# Patient Record
Sex: FEMALE | Race: OTHER | HISPANIC OR LATINO
[De-identification: names, ages, dates, MRNs, and addresses within clinical notes are randomized per-mention and may not be internally consistent; named-entity substitution may affect disease eponyms.]

---

## 2017-10-25 ENCOUNTER — APPOINTMENT (OUTPATIENT)
Dept: INTERNAL MEDICINE | Facility: CLINIC | Age: 47
End: 2017-10-25
Payer: MEDICAID

## 2017-10-25 DIAGNOSIS — F41.0 PANIC DISORDER [EPISODIC PAROXYSMAL ANXIETY]: ICD-10-CM

## 2017-10-25 PROCEDURE — 99396 PREV VISIT EST AGE 40-64: CPT

## 2017-10-26 VITALS
WEIGHT: 157 LBS | BODY MASS INDEX: 26.95 KG/M2 | DIASTOLIC BLOOD PRESSURE: 82 MMHG | HEART RATE: 74 BPM | RESPIRATION RATE: 14 BRPM | SYSTOLIC BLOOD PRESSURE: 120 MMHG

## 2017-10-31 LAB
25(OH)D3 SERPL-MCNC: 17.9 NG/ML
ALBUMIN SERPL ELPH-MCNC: 4.2 G/DL
ALP BLD-CCNC: 94 U/L
ALT SERPL-CCNC: 15 U/L
ANION GAP SERPL CALC-SCNC: 13 MMOL/L
AST SERPL-CCNC: 18 U/L
BILIRUB SERPL-MCNC: 0.2 MG/DL
BUN SERPL-MCNC: 12 MG/DL
CALCIUM SERPL-MCNC: 9.7 MG/DL
CHLORIDE SERPL-SCNC: 106 MMOL/L
CHOLEST SERPL-MCNC: 210 MG/DL
CHOLEST/HDLC SERPL: 3.3 RATIO
CO2 SERPL-SCNC: 24 MMOL/L
CREAT SERPL-MCNC: 0.84 MG/DL
GLUCOSE SERPL-MCNC: 101 MG/DL
HBA1C MFR BLD HPLC: 5.7 %
HDLC SERPL-MCNC: 63 MG/DL
LDLC SERPL CALC-MCNC: 126 MG/DL
POTASSIUM SERPL-SCNC: 4.1 MMOL/L
PROT SERPL-MCNC: 7.5 G/DL
SODIUM SERPL-SCNC: 143 MMOL/L
TRIGL SERPL-MCNC: 107 MG/DL
TSH SERPL-ACNC: 4.27 UIU/ML

## 2018-08-28 ENCOUNTER — OTHER (OUTPATIENT)
Age: 48
End: 2018-08-28

## 2019-04-10 ENCOUNTER — TRANSCRIPTION ENCOUNTER (OUTPATIENT)
Age: 49
End: 2019-04-10

## 2020-03-11 ENCOUNTER — TRANSCRIPTION ENCOUNTER (OUTPATIENT)
Age: 50
End: 2020-03-11

## 2020-04-03 ENCOUNTER — TRANSCRIPTION ENCOUNTER (OUTPATIENT)
Age: 50
End: 2020-04-03

## 2020-04-10 ENCOUNTER — APPOINTMENT (OUTPATIENT)
Dept: INTERNAL MEDICINE | Facility: CLINIC | Age: 50
End: 2020-04-10
Payer: COMMERCIAL

## 2020-04-10 PROCEDURE — 99443: CPT

## 2020-04-11 NOTE — HISTORY OF PRESENT ILLNESS
[Verbal consent obtained from patient] : the patient, [unfilled] [FreeTextEntry1] : Pt wanted telephone visit to review known + COVID hx, and to get help with lingering syx.  \par \par Reports course : works as a director of youth services in [employer], had + coworkers, one .  On 3/11 she had cough, started w fevers 100-102, and had some exacerbation of ?IBS diarrhea (vs. viral syx).  She went for eval/testing - was +.  Went through illness of fever/fatigue/myalgia, not much cough worsening, no sob.  Recuperated.  Has been fever free since 3/21.  Now left with some moments of dry cough - was given tessalon perles and albuterol this past week, and has used those sparingly.  \par \par She is bothered by that cough, by a return of her panic attacks (has definite hx of anxiety - has needed SSRI and clonazepam in past), difficulty sleeping.  Feels very jittery - she wonders if the lost sleep, the occ albuterol, her processing what happened, losing her co worker all responsible.  Had access to tele psychiatrist.  Discussed, started on sertraline 25 mg, still on.  She was wondering if that was contributing to her jitteriness, insomnia.  Started having some fluttering/skip feeling in her chest.  Got very worried about those as she couldn't fall asleep.  Went to urgent care - bp was up, they tried to reassure about the likely APCs, has had continued hot flash feeling/lightheaded, feelings of apprehension, etc.  \par \par Had long talk - reassured her she had mild course of COVID, she realizes.  Likely not viral anymore, left with postinfectious cough.  She's like to renew benzonatate, was helpful.  Also encouraged to continue the SSRI, it's low dose and doubt responsible for any of her syx now.  Believe her insomnia, palpitations, jitteriness all related to her anxiety, natural for everything she went through, and with her hx of same/panic attacks, etc.  \par \par Decided that for next 2 weeks or so, she'd add clonazepam .5 BID just to get relief from the anxious part of her syx.  She agrees and will report its addition to the telepsychiatrist as they decide to titrate the sertraline at their next scheduled talk.  Reassured her about all syx, believe she just needs to ease adrenaline, get some sleep.  She will call with any changes/worsening.  FC [Time Spent: ___ minutes] : I have spent [unfilled] minutes with the patient on the telephone

## 2020-04-22 ENCOUNTER — APPOINTMENT (OUTPATIENT)
Dept: INTERNAL MEDICINE | Facility: CLINIC | Age: 50
End: 2020-04-22
Payer: COMMERCIAL

## 2020-04-22 PROCEDURE — 99443: CPT

## 2020-04-26 ENCOUNTER — TRANSCRIPTION ENCOUNTER (OUTPATIENT)
Age: 50
End: 2020-04-26

## 2020-06-03 ENCOUNTER — APPOINTMENT (OUTPATIENT)
Dept: INTERNAL MEDICINE | Facility: CLINIC | Age: 50
End: 2020-06-03

## 2020-06-09 ENCOUNTER — APPOINTMENT (OUTPATIENT)
Dept: INTERNAL MEDICINE | Facility: CLINIC | Age: 50
End: 2020-06-09
Payer: COMMERCIAL

## 2020-06-09 VITALS
RESPIRATION RATE: 14 BRPM | DIASTOLIC BLOOD PRESSURE: 76 MMHG | HEART RATE: 72 BPM | SYSTOLIC BLOOD PRESSURE: 118 MMHG | WEIGHT: 140 LBS | BODY MASS INDEX: 24.03 KG/M2

## 2020-06-09 DIAGNOSIS — E05.90 THYROTOXICOSIS, UNSPECIFIED W/OUT THYROTOXIC CRISIS OR STORM: ICD-10-CM

## 2020-06-09 DIAGNOSIS — Z00.00 ENCOUNTER FOR GENERAL ADULT MEDICAL EXAMINATION W/OUT ABNORMAL FINDINGS: ICD-10-CM

## 2020-06-09 DIAGNOSIS — R00.2 PALPITATIONS: ICD-10-CM

## 2020-06-09 DIAGNOSIS — F41.9 ANXIETY DISORDER, UNSPECIFIED: ICD-10-CM

## 2020-06-09 DIAGNOSIS — U07.1 COVID-19: ICD-10-CM

## 2020-06-09 DIAGNOSIS — J30.9 ALLERGIC RHINITIS, UNSPECIFIED: ICD-10-CM

## 2020-06-09 DIAGNOSIS — R05 COUGH: ICD-10-CM

## 2020-06-09 PROCEDURE — G0442 ANNUAL ALCOHOL SCREEN 15 MIN: CPT

## 2020-06-09 PROCEDURE — 99396 PREV VISIT EST AGE 40-64: CPT | Mod: 25

## 2020-06-09 RX ORDER — BENZONATATE 100 MG/1
100 CAPSULE ORAL 3 TIMES DAILY
Qty: 21 | Refills: 0 | Status: DISCONTINUED | COMMUNITY
Start: 2020-04-10 | End: 2020-06-09

## 2020-06-09 RX ORDER — CLONAZEPAM 0.5 MG/1
0.5 TABLET ORAL
Qty: 14 | Refills: 0 | Status: ACTIVE | COMMUNITY
Start: 2017-10-25 | End: 1900-01-01

## 2020-06-09 RX ORDER — FLUTICASONE PROPIONATE 50 UG/1
50 SPRAY, METERED NASAL DAILY
Qty: 3 | Refills: 3 | Status: ACTIVE | COMMUNITY
Start: 2020-06-09 | End: 1900-01-01

## 2020-06-09 NOTE — ASSESSMENT
[FreeTextEntry1] : \par Age appropriate screening tests and vaccines reviewed.  \par Rationale for services described. \par Declines Tdap Vaccine\par Due for Mammogram\par Due for Pap smear\par \par This patient has no special communication needs.\par This patient's health literacy was assessed through teach back and was found to be adequate.\par \par \par \par \par \par \par

## 2020-06-09 NOTE — PHYSICAL EXAM
[Well Nourished] : well nourished [No Acute Distress] : no acute distress [Well Developed] : well developed [Well-Appearing] : well-appearing [Normal Sclera/Conjunctiva] : normal sclera/conjunctiva [PERRL] : pupils equal round and reactive to light [EOMI] : extraocular movements intact [Normal Outer Ear/Nose] : the outer ears and nose were normal in appearance [Normal Oropharynx] : the oropharynx was normal [No Lymphadenopathy] : no lymphadenopathy [No JVD] : no jugular venous distention [Supple] : supple [Thyroid Normal, No Nodules] : the thyroid was normal and there were no nodules present [No Respiratory Distress] : no respiratory distress  [No Accessory Muscle Use] : no accessory muscle use [Normal Rate] : normal rate  [Clear to Auscultation] : lungs were clear to auscultation bilaterally [Regular Rhythm] : with a regular rhythm [Normal S1, S2] : normal S1 and S2 [No Carotid Bruits] : no carotid bruits [No Murmur] : no murmur heard [No Varicosities] : no varicosities [No Abdominal Bruit] : a ~M bruit was not heard ~T in the abdomen [Pedal Pulses Present] : the pedal pulses are present [No Edema] : there was no peripheral edema [No Palpable Aorta] : no palpable aorta [Soft] : abdomen soft [No Extremity Clubbing/Cyanosis] : no extremity clubbing/cyanosis [Non-distended] : non-distended [Non Tender] : non-tender [No Masses] : no abdominal mass palpated [No HSM] : no HSM [Normal Bowel Sounds] : normal bowel sounds [Normal Posterior Cervical Nodes] : no posterior cervical lymphadenopathy [Normal Anterior Cervical Nodes] : no anterior cervical lymphadenopathy [No CVA Tenderness] : no CVA  tenderness [No Spinal Tenderness] : no spinal tenderness [No Joint Swelling] : no joint swelling [Grossly Normal Strength/Tone] : grossly normal strength/tone [No Rash] : no rash [Coordination Grossly Intact] : coordination grossly intact [No Focal Deficits] : no focal deficits [Normal Gait] : normal gait [Normal Insight/Judgement] : insight and judgment were intact [Deep Tendon Reflexes (DTR)] : deep tendon reflexes were 2+ and symmetric [Normal Affect] : the affect was normal [de-identified] : Markedly inflamed nasal mucosa

## 2020-06-09 NOTE — HEALTH RISK ASSESSMENT
[Very Good] : ~his/her~ current health as very good [Good] : ~his/her~  mood as  good [] : No [Monthly or less (1 pt)] : Monthly or less (1 point) [1 or 2 (0 pts)] : 1 or 2 (0 points) [Never (0 pts)] : Never (0 points) [No] : In the past 12 months have you used drugs other than those required for medical reasons? No [No falls in past year] : Patient reported no falls in the past year [0] : 2) Feeling down, depressed, or hopeless: Not at all (0) [Audit-CScore] : 1 [[id number]] : 0 [Change in mental status noted] : No change in mental status noted [Language] : denies difficulty with language [Behavior] : denies difficulty with behavior [Learning/Retaining New Information] : denies difficulty learning/retaining new information [Handling Complex Tasks] : denies difficulty handling complex tasks [Reasoning] : denies difficulty with reasoning [Spatial Ability and Orientation] : denies difficulty with spatial ability and orientation [Employed] : employed [Feels Safe at Home] : Feels safe at home [Fully functional (using the telephone, shopping, preparing meals, housekeeping, doing laundry, using] : Fully functional and needs no help or supervision to perform IADLs (using the telephone, shopping, preparing meals, housekeeping, doing laundry, using transportation, managing medications and managing finances) [Fully functional (bathing, dressing, toileting, transferring, walking, feeding)] : Fully functional (bathing, dressing, toileting, transferring, walking, feeding) [Reports changes in hearing] : Reports no changes in hearing [Reports normal functional visual acuity (ie: able to read med bottle)] : Reports normal functional visual acuity [Reports changes in vision] : Reports no changes in vision [Reports changes in dental health] : Reports no changes in dental health [Smoke Detector] : smoke detector [Seat Belt] :  uses seat belt [PapSmearComments] : will make appointment with gyn

## 2020-06-09 NOTE — HISTORY OF PRESENT ILLNESS
[FreeTextEntry1] : Ms. ROE is here for an annual physical examination and assessment.\par  [de-identified] : She generally feels well with a few specific complaints. Her recent health has been good; recovered well from COVID.\par \par Has two issues. \par \par Denies headache, dizziness.\par Denies rash, fatigue, fever, weight loss, anorexia.\par Denies wheezing.\par Denies CP, SOB, WAY, edema, palpitations.\par Denies abdominal pain, N/V/D/C. Denies BRBPR, melena, dysphagia.\par Denies dysuria, frequency, hematuria, hesitancy.\par Denies weakness, numbness, gait instability.\par \par \par \par  [Initial Evaluation] : an initial evaluation of [Runny Nose] : runny nose [Nasal Congestion] : nasal congestion [Cough] : cough [Itching Throat] : itching throat [Sneezing] : no sneezing [Wheezing] : no wheezing [Gradual] : gradual [Currently Experiencing] : The patient is currently experiencing symptoms. [___ Week(s) Ago] : [unfilled] week(s) ago [Pollen Exposure] : pollen exposure [Daytime] : during the day [Dyspnea] : no dyspnea [de-identified] : ear popping [Anxiety] : anxiety [None] : ~He/She~ has had no significant interval events [Anxiety] : stable anxiety [Decreased Concentrating Ability] : denies difficulty concentrating [Feeling Restless] : denies restlessness [Anxiety With Persistent Worry About Recurrent Panic Attacks] : denies panic attacks [Sleep Disturbances] : denies sleep disruption [Depression] : denies depression [de-identified] : stable; seeing psychiatrist

## 2020-06-10 LAB
25(OH)D3 SERPL-MCNC: 13.8 NG/ML
ALBUMIN SERPL ELPH-MCNC: 4.7 G/DL
ALP BLD-CCNC: 89 U/L
ALT SERPL-CCNC: 21 U/L
ANION GAP SERPL CALC-SCNC: 17 MMOL/L
AST SERPL-CCNC: 20 U/L
BASOPHILS # BLD AUTO: 0.07 K/UL
BASOPHILS NFR BLD AUTO: 1.3 %
BILIRUB SERPL-MCNC: 0.5 MG/DL
BUN SERPL-MCNC: 11 MG/DL
CALCIUM SERPL-MCNC: 9.6 MG/DL
CHLORIDE SERPL-SCNC: 104 MMOL/L
CHOLEST SERPL-MCNC: 189 MG/DL
CHOLEST/HDLC SERPL: 3.4 RATIO
CO2 SERPL-SCNC: 22 MMOL/L
CREAT SERPL-MCNC: 0.73 MG/DL
EOSINOPHIL # BLD AUTO: 0.02 K/UL
EOSINOPHIL NFR BLD AUTO: 0.4 %
ESTIMATED AVERAGE GLUCOSE: 108 MG/DL
GLUCOSE SERPL-MCNC: 95 MG/DL
HBA1C MFR BLD HPLC: 5.4 %
HCT VFR BLD CALC: 43.4 %
HDLC SERPL-MCNC: 56 MG/DL
HGB BLD-MCNC: 13.9 G/DL
IMM GRANULOCYTES NFR BLD AUTO: 0.2 %
LDLC SERPL CALC-MCNC: 111 MG/DL
LDLC SERPL DIRECT ASSAY-MCNC: 116 MG/DL
LYMPHOCYTES # BLD AUTO: 1.58 K/UL
LYMPHOCYTES NFR BLD AUTO: 29.4 %
MAN DIFF?: NORMAL
MCHC RBC-ENTMCNC: 29.8 PG
MCHC RBC-ENTMCNC: 32 GM/DL
MCV RBC AUTO: 93.1 FL
MONOCYTES # BLD AUTO: 0.42 K/UL
MONOCYTES NFR BLD AUTO: 7.8 %
NEUTROPHILS # BLD AUTO: 3.27 K/UL
NEUTROPHILS NFR BLD AUTO: 60.9 %
PLATELET # BLD AUTO: 239 K/UL
POTASSIUM SERPL-SCNC: 4 MMOL/L
PROT SERPL-MCNC: 7.4 G/DL
RBC # BLD: 4.66 M/UL
RBC # FLD: 14.6 %
SODIUM SERPL-SCNC: 142 MMOL/L
TRIGL SERPL-MCNC: 114 MG/DL
TSH SERPL-ACNC: 2.66 UIU/ML
WBC # FLD AUTO: 5.37 K/UL

## 2022-01-31 ENCOUNTER — RX RENEWAL (OUTPATIENT)
Age: 52
End: 2022-01-31

## 2022-01-31 RX ORDER — SERTRALINE HYDROCHLORIDE 50 MG/1
50 TABLET, FILM COATED ORAL
Qty: 90 | Refills: 3 | Status: ACTIVE | COMMUNITY
Start: 2020-11-10 | End: 1900-01-01

## 2024-08-21 ENCOUNTER — APPOINTMENT (OUTPATIENT)
Dept: OBGYN | Facility: CLINIC | Age: 54
End: 2024-08-21
Payer: COMMERCIAL

## 2024-08-21 VITALS
SYSTOLIC BLOOD PRESSURE: 128 MMHG | DIASTOLIC BLOOD PRESSURE: 88 MMHG | BODY MASS INDEX: 22.39 KG/M2 | HEIGHT: 64 IN | WEIGHT: 131.13 LBS

## 2024-08-21 DIAGNOSIS — Z12.4 ENCOUNTER FOR SCREENING FOR MALIGNANT NEOPLASM OF CERVIX: ICD-10-CM

## 2024-08-21 DIAGNOSIS — N95.1 MENOPAUSAL AND FEMALE CLIMACTERIC STATES: ICD-10-CM

## 2024-08-21 DIAGNOSIS — R92.30 DENSE BREASTS, UNSPECIFIED: ICD-10-CM

## 2024-08-21 DIAGNOSIS — Z97.5 PRESENCE OF (INTRAUTERINE) CONTRACEPTIVE DEVICE: ICD-10-CM

## 2024-08-21 DIAGNOSIS — Z01.419 ENCOUNTER FOR GYNECOLOGICAL EXAMINATION (GENERAL) (ROUTINE) W/OUT ABNORMAL FINDINGS: ICD-10-CM

## 2024-08-21 DIAGNOSIS — Z12.39 ENCOUNTER FOR OTHER SCREENING FOR MALIGNANT NEOPLASM OF BREAST: ICD-10-CM

## 2024-08-21 DIAGNOSIS — D21.9 BENIGN NEOPLASM OF CONNECTIVE AND OTHER SOFT TISSUE, UNSPECIFIED: ICD-10-CM

## 2024-08-21 DIAGNOSIS — N92.1 EXCESSIVE AND FREQUENT MENSTRUATION WITH IRREGULAR CYCLE: ICD-10-CM

## 2024-08-21 PROCEDURE — 99459 PELVIC EXAMINATION: CPT

## 2024-08-21 PROCEDURE — 99203 OFFICE O/P NEW LOW 30 MIN: CPT | Mod: 25

## 2024-08-21 PROCEDURE — 99386 PREV VISIT NEW AGE 40-64: CPT

## 2024-08-21 RX ORDER — MISOPROSTOL 200 UG/1
200 TABLET ORAL
Qty: 2 | Refills: 0 | Status: ACTIVE | COMMUNITY
Start: 2024-08-21 | End: 1900-01-01

## 2024-08-21 NOTE — PLAN
[FreeTextEntry1] : thin prep with hr hpv  order blood work to check hormones  Pelvic US ordered  follow up for Hysteroscopy with IUD removal and EMBx  Rx cytotec sent  Order for Screening Mammogram and Breast US given

## 2024-08-21 NOTE — PHYSICAL EXAM
[Chaperone Present] : A chaperone was present in the examining room during all aspects of the physical examination [58040] : A chaperone was present during the pelvic exam. [FreeTextEntry2] : Shabbir [Appropriately responsive] : appropriately responsive [Alert] : alert [No Acute Distress] : no acute distress [No Lymphadenopathy] : no lymphadenopathy [Regular Rate Rhythm] : regular rate rhythm [No Murmurs] : no murmurs [Clear to Auscultation B/L] : clear to auscultation bilaterally [Soft] : soft [Non-tender] : non-tender [Non-distended] : non-distended [No HSM] : No HSM [No Mass] : no mass [Oriented x3] : oriented x3 [Examination Of The Breasts] : a normal appearance [No Discharge] : no discharge [No Masses] : no breast masses were palpable [No Lesions] : no lesions  [Labia Majora] : normal [Labia Minora] : normal [Cystocele] : a cystocele [Pink Rugae] : pink rugae [No Bleeding] : There was no active vaginal bleeding [Normal] : normal [Tenderness] : nontender [Enlarged ___ wks] : enlarged [unfilled] ~Uweeks [Uterine Adnexae] : normal [Declined] : Patient declined rectal exam [FreeTextEntry5] : IUD string no visible [FreeTextEntry6] : enlarged fibroid uterus   firm, non tender

## 2024-08-21 NOTE — HISTORY OF PRESENT ILLNESS
[FreeTextEntry1] : 53 yo in for annual exam/pap smear and discussion on irregular bleeding and pelvic pressure Patient reports anxiety and depression have worsened in last few months Reports menses was only light, brown bleeding.  Sudden gush of blood recently     Patient has Mirena IUD in since 2013 Last Pap with Dr Lindsey in 2014  Patient with known uterine fibroids on last pelvic US in 2014  Recently had a CT Pelvis 3/2024 and informed of uterine fibroids  Patient agrees to have a Pelvic US for further imaging  Requests removal of IUD - no strings visible today  I informed patient we should rule out endometrial malignancy with tissue sampling   She agrees to hysteroscopy and EMBx on next visit We may also be able to visualized IUD and remove IUD at same visit  Denies tobacco, alcohol, or drug use Denies family history of malignancy  Reports last mammogram 10/2023 wnl and Breast US 10/2023  wnl  Denies frequent constipation or blood in stools   Does report pelvic pressure  Patient requests hormone check

## 2024-08-23 LAB
DHEA-S SERPL-MCNC: 70.7 UG/DL
ESTRADIOL SERPL-MCNC: 191 PG/ML
FSH SERPL-MCNC: 37.5 IU/L
HCG SERPL-MCNC: <1 MIU/ML
HPV HIGH+LOW RISK DNA PNL CVX: NOT DETECTED
LH SERPL-ACNC: 58.7 IU/L
PROLACTIN SERPL-MCNC: 14.1 NG/ML
TESTOST FREE SERPL-MCNC: 0.5 PG/ML
TESTOST SERPL-MCNC: 30.7 NG/DL
TSH SERPL-ACNC: 3.5 UIU/ML

## 2024-08-27 LAB
17OHP SERPL-MCNC: 173 NG/DL
ANDROST SERPL-MCNC: 121 NG/DL
CYTOLOGY CVX/VAG DOC THIN PREP: NORMAL

## 2024-09-03 ENCOUNTER — NON-APPOINTMENT (OUTPATIENT)
Age: 54
End: 2024-09-03

## 2024-09-18 ENCOUNTER — APPOINTMENT (OUTPATIENT)
Dept: OBGYN | Facility: CLINIC | Age: 54
End: 2024-09-18
Payer: COMMERCIAL

## 2024-09-18 VITALS
SYSTOLIC BLOOD PRESSURE: 128 MMHG | WEIGHT: 135 LBS | DIASTOLIC BLOOD PRESSURE: 82 MMHG | BODY MASS INDEX: 23.05 KG/M2 | HEIGHT: 64 IN

## 2024-09-18 DIAGNOSIS — N92.1 EXCESSIVE AND FREQUENT MENSTRUATION WITH IRREGULAR CYCLE: ICD-10-CM

## 2024-09-18 DIAGNOSIS — D21.9 BENIGN NEOPLASM OF CONNECTIVE AND OTHER SOFT TISSUE, UNSPECIFIED: ICD-10-CM

## 2024-09-18 PROCEDURE — 58558Z: CUSTOM

## 2024-09-18 NOTE — PHYSICAL EXAM
[Chaperone Present] : A chaperone was present in the examining room during all aspects of the physical examination [[id number]] : A chaperone was present during the pelvic exam. [FreeTextEntry2] : Izabela [Appropriately responsive] : appropriately responsive [Alert] : alert [No Acute Distress] : no acute distress [Soft] : soft [Non-tender] : non-tender [Non-distended] : non-distended [Oriented x3] : oriented x3

## 2024-09-18 NOTE — HISTORY OF PRESENT ILLNESS
[FreeTextEntry1] : 53 yo in for follow up on sudden heavy bleeding   Pelvic US demonstrated small fibroids  IUD in place Patient desires IUD removal; strings not visible  Patient agrees to Endosee hysteroscopy for IUD removal and endometrial biopsy  Consent signed

## 2024-09-18 NOTE — PLAN
[FreeTextEntry1] : follow up path results follow up gyn ~ 2 weeks to discuss results and options for bleeding

## 2024-09-18 NOTE — PROCEDURE
[Hysteroscopy] : Hysteroscopy [Time out performed] : Pre-procedure time out performed.  Patient's name, date of birth and procedure confirmed. 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) [Risks] : risks [Benefits] : benefits [Alternatives] : alternatives [Patient] : patient [Infection] : infection [Bleeding] : bleeding [Allergic Reaction] : allergic reaction [Pretreatment with misoprostol] : pretreatment with misoprostol [flexible] : Using aseptic technique a hysteroscopy was performed using a flexible hysteroscope [Sent to Pathology] : specimen was placed in buffered formalin and sent for pathology [Antibiotics given] : antibiotics not given [Hemostasis obtained] : hemostasis obtained [Tolerated Well] : Patient tolerated the procedure well [de-identified] : cervix prepped with betadine anterior lip of cervix grasped with single tooth tenaculum cervix dilated    uterus sounded to 10.5 cm Endosee hysteroscope inserted   anterior submucosal fibroid noted   IUD noted embedded within myometrium on patient right side  tip visible and string within endometrial cavity   alligator forcep used to grasp IUD string and slowly removed IUD   IUD intact  Pipelle used to obtain endometrial biopsy [de-identified] : endometrial biopsy via Pipelle

## 2024-09-25 LAB — CORE LAB BIOPSY: NORMAL

## 2024-10-16 ENCOUNTER — APPOINTMENT (OUTPATIENT)
Dept: OBGYN | Facility: CLINIC | Age: 54
End: 2024-10-16
Payer: COMMERCIAL

## 2024-10-16 VITALS
SYSTOLIC BLOOD PRESSURE: 135 MMHG | DIASTOLIC BLOOD PRESSURE: 80 MMHG | BODY MASS INDEX: 23.05 KG/M2 | HEIGHT: 64 IN | WEIGHT: 135 LBS

## 2024-10-16 DIAGNOSIS — D21.9 BENIGN NEOPLASM OF CONNECTIVE AND OTHER SOFT TISSUE, UNSPECIFIED: ICD-10-CM

## 2024-10-16 DIAGNOSIS — N92.1 EXCESSIVE AND FREQUENT MENSTRUATION WITH IRREGULAR CYCLE: ICD-10-CM

## 2024-10-16 PROCEDURE — 58100 BIOPSY OF UTERUS LINING: CPT

## 2024-10-23 LAB — CORE LAB BIOPSY: NORMAL
